# Patient Record
Sex: MALE | URBAN - METROPOLITAN AREA
[De-identification: names, ages, dates, MRNs, and addresses within clinical notes are randomized per-mention and may not be internally consistent; named-entity substitution may affect disease eponyms.]

---

## 2017-09-26 ENCOUNTER — APPOINTMENT (RX ONLY)
Dept: URBAN - METROPOLITAN AREA CLINIC 89 | Facility: CLINIC | Age: 82
Setting detail: DERMATOLOGY
End: 2017-09-26

## 2017-09-26 DIAGNOSIS — L30.9 DERMATITIS, UNSPECIFIED: ICD-10-CM

## 2017-09-26 PROBLEM — C18.9 MALIGNANT NEOPLASM OF COLON, UNSPECIFIED: Status: ACTIVE | Noted: 2017-09-26

## 2017-09-26 PROBLEM — L85.3 XEROSIS CUTIS: Status: ACTIVE | Noted: 2017-09-26

## 2017-09-26 PROBLEM — Z85.46 PERSONAL HISTORY OF MALIGNANT NEOPLASM OF PROSTATE: Status: ACTIVE | Noted: 2017-09-26

## 2017-09-26 PROCEDURE — 99203 OFFICE O/P NEW LOW 30 MIN: CPT

## 2017-09-26 PROCEDURE — ? TREATMENT REGIMEN

## 2017-09-26 PROCEDURE — ? COUNSELING

## 2017-09-26 PROCEDURE — ? PRESCRIPTION

## 2017-09-26 RX ORDER — BETAMETHASONE DIPROPIONATE 0.5 MG/G
1 OINTMENT TOPICAL BID
Qty: 1 | Refills: 3 | Status: ERX

## 2017-09-26 ASSESSMENT — LOCATION SIMPLE DESCRIPTION DERM: LOCATION SIMPLE: LEFT FOREARM

## 2017-09-26 ASSESSMENT — LOCATION ZONE DERM: LOCATION ZONE: ARM

## 2017-09-26 ASSESSMENT — LOCATION DETAILED DESCRIPTION DERM: LOCATION DETAILED: LEFT DISTAL DORSAL FOREARM

## 2017-09-26 NOTE — PROCEDURE: TREATMENT REGIMEN
Plan: Patient will return for patch testing once we hear back from the billing department
Detail Level: Zone
Initiate Treatment: Domeboro soaks twice daily followed by betamethasone ointment. extensive discussion regarding patch testing

## 2017-09-28 ENCOUNTER — RX ONLY (OUTPATIENT)
Age: 82
Setting detail: RX ONLY
End: 2017-09-28

## 2017-09-28 RX ORDER — TRIAMCINOLONE ACETONIDE 1 MG/G
OINTMENT TOPICAL
Qty: 1 | Refills: 1 | Status: ERX | COMMUNITY
Start: 2017-09-28

## 2017-10-02 ENCOUNTER — APPOINTMENT (RX ONLY)
Dept: URBAN - METROPOLITAN AREA CLINIC 57 | Facility: CLINIC | Age: 82
Setting detail: DERMATOLOGY
End: 2017-10-02

## 2017-10-02 DIAGNOSIS — R21 RASH AND OTHER NONSPECIFIC SKIN ERUPTION: ICD-10-CM

## 2017-10-02 PROBLEM — I10 ESSENTIAL (PRIMARY) HYPERTENSION: Status: ACTIVE | Noted: 2017-10-02

## 2017-10-02 PROCEDURE — ? PATCH TESTING

## 2017-10-02 PROCEDURE — ? COUNSELING

## 2017-10-02 PROCEDURE — ? TREATMENT REGIMEN

## 2017-10-02 PROCEDURE — 99213 OFFICE O/P EST LOW 20 MIN: CPT | Mod: 25

## 2017-10-02 PROCEDURE — 95044 PATCH/APPLICATION TESTS: CPT

## 2017-10-02 PROCEDURE — ? SEPARATE AND IDENTIFIABLE DOCUMENTATION

## 2017-10-02 ASSESSMENT — LOCATION SIMPLE DESCRIPTION DERM: LOCATION SIMPLE: LEFT FOREARM

## 2017-10-02 ASSESSMENT — LOCATION ZONE DERM: LOCATION ZONE: ARM

## 2017-10-02 ASSESSMENT — LOCATION DETAILED DESCRIPTION DERM: LOCATION DETAILED: LEFT DISTAL DORSAL FOREARM

## 2017-10-02 NOTE — PROCEDURE: TREATMENT REGIMEN
Continue Regimen: Domeboro soaks twice daily followed by triamcinolone ointment
Detail Level: Zone
Plan: Again extensive discussion regarding patch testing, patient will return for patch testing on Wednesday

## 2017-10-02 NOTE — PROCEDURE: PATCH TESTING
Consent: Verbal consent obtained, risks reviewed including but not limited to rash, itching, allergic reaction, systemic rash, remote possiblity of anaphylaxis to allergen.
Detail Level: Zone
Number Of Patches (Maximum Allowable Per Dos By Cms Is 90): 80
Post-Care Instructions: I reviewed with the patient in detail post-care instructions. Patient should not sweat, pick at, or get the patches wet for 48 hours.

## 2017-10-04 ENCOUNTER — APPOINTMENT (RX ONLY)
Dept: URBAN - METROPOLITAN AREA CLINIC 57 | Facility: CLINIC | Age: 82
Setting detail: DERMATOLOGY
End: 2017-10-04

## 2017-10-04 DIAGNOSIS — R21 RASH AND OTHER NONSPECIFIC SKIN ERUPTION: ICD-10-CM

## 2017-10-04 PROCEDURE — ? TREATMENT REGIMEN

## 2017-10-04 PROCEDURE — 99213 OFFICE O/P EST LOW 20 MIN: CPT

## 2017-10-04 PROCEDURE — ? COUNSELING

## 2017-10-04 PROCEDURE — ? NORTH AMERICAN 80 PATCH TEST READING

## 2017-10-04 ASSESSMENT — LOCATION SIMPLE DESCRIPTION DERM: LOCATION SIMPLE: LEFT FOREARM

## 2017-10-04 ASSESSMENT — LOCATION DETAILED DESCRIPTION DERM: LOCATION DETAILED: LEFT DISTAL DORSAL FOREARM

## 2017-10-04 ASSESSMENT — LOCATION ZONE DERM: LOCATION ZONE: ARM

## 2017-10-04 NOTE — PROCEDURE: NORTH AMERICAN 80 PATCH TEST READING
Name Of Allergen 71: Isoamyl-p-methoxycinnamate
Name Of Allergen 74: Benzalkonium chloride
Allergen 89 Reaction: no reaction
Name Of Allergen 56: DMDM Hydantoin
Name Of Allergen 61: Desoximetasone
Name Of Allergen 62: Polysorbate 80 / (Polyoxyethylenesorbitanmonooleate(Tween 80))
Name Of Allergen 50: Fragrance Mix II
Name Of Allergen 21: Diazolidinylurea (Germall II)
What Reading Time Point?: 48 hour
Name Of Allergen 41: Toluenesulfonamide formaldehyde resin
Show Allergen Counseling In The Note?: Yes
Name Of Allergen 29: Glutaral / (Glutaraldehyde)
Name Of Allergen 68: Fusidic acid sodium salt
Name Of Allergen 49: Tea Tree Oil
Name Of Allergen 25: Disperse Orange 3
Name Of Allergen 51: Disperse Yellow 3
Name Of Allergen 60: Triclosan (Irgasan )
Name Of Allergen 80: Dimethylol dihydroxyethyleneurea( Fix.CPN)
Name Of Allergen 39: Ethyl acrylate
Detail Level: Zone
Name Of Allergen 45: Budesonide
Name Of Allergen 9: Neomycin sulfate
Allergen 28 Reaction: 1+
Name Of Allergen 33: Propolis
Name Of Allergen 30: 2-Bromo-2-nitropropane-l,3-diol (Bronopol)
Name Of Allergen 8: carba mix
Name Of Allergen 14: Quaternium-15 (Dowicil 200) / (1-(3-Chloroallyl)-3,5,7-triaza-1- azoniaadamantane chloride)
Allergen 19 Reaction: 2+
Name Of Allergen 72: Hydroxyisohexyl 3-Cyclohexene Carboxaldehyde (Lyral)
Name Of Allergen 75: Amidoamine
Number Of Patches Read: 80
Name Of Allergen 78: Methylisothiazolinone + Methylchloroisothiazolinone / (Cl+-Me- isothiazolinone (Ute CG 200ppm))
Name Of Allergen 13: Epoxy resin Bisphenol A
Name Of Allergen 63: Iodopropynyl butyl carbamate
Name Of Allergen 32: Thimerosal (Merthiolate)
Name Of Allergen 44: Tixocortol-21-pivalate
Name Of Allergen 54: METHYLISOTHIAZOLINONE
Name Of Allergen 20: Nickelsulfate hexahydrate
Name Of Allergen 48: Hydrocortisone-17-butyrate
Name Of Allergen 26: Paraben Mix
Name Of Allergen 2: 2-Mercaptobenzothiazole (MBT)
Name Of Allergen 52: Benzyl salicylate
Name Of Allergen 5: Imidazolidinyl urea Germall 115)
Name Of Allergen 23: bacitracin
Name Of Allergen 36: Ethyleneurea, melamine formaldehyde mix
Name Of Allergen 1: Benzocaine
Name Of Allergen 42: Methyl methacrylate
Name Of Allergen 67: Lidocaine
Name Of Allergen 66: Compositae Mix II
Name Of Allergen 37: 2-tert-Butyl-4-methoxyphenol (BHA)
Name Of Allergen 35: Chloroxylenol (PCMX) / (4-Chloro-3,5-xylenol (PCMX))
Name Of Allergen 6: Cinnamal / (Cinnamic aldehyde)
Name Of Allergen 3: Colophonium / (Colophony)
Name Of Allergen 11: Clobetasol-17-propionate
Name Of Allergen 79: Propylene glycol
Name Of Allergen 77: Formaldehyde
Name Of Allergen 31: Sesquiterpene lactone mix
Name Of Allergen 15: 8-shdo-Awoerdrmsriozydjjoqvkwr resi
Name Of Allergen 47: Triethanolamine
Name Of Allergen 24: Mixed dialkyl thiourea
Name Of Allergen 27: Methyldibromo glutaronitrile (MDBGN)
Name Of Allergen 4: p-Phenylenediamine (PPD)
Name Of Allergen 43: Cobalt(II) chloride hexahydrate
Name Of Allergen 64: 2-n-Octyl-4-isothiazolin-3-one
Name Of Allergen 12: Ethylenediamine dihydrochloride
Name Of Allergen 55: HEMA (2-Hydroxyethyl methacrylate)
Name Of Allergen 69: Dibucaine hydrochloride
Name Of Allergen 34: Benzophenone-3 / (2-Hydroxy-4-methoxybenzophenone)
Name Of Allergen 53: Decyl Glucoside
Name Of Allergen 65: Disperse Blue 106/124 Mix
Name Of Allergen 19: Myroxylon pereirae resin / (Balsam [place])
Name Of Allergen 76: Cocamidopropylbetaine
Name Of Allergen 38: Gold(I)sodium thiosulfate dihydrate
Name Of Allergen 18: Potassium dichromate
Name Of Allergen 59: Isopropyl myristate
Name Of Allergen 16: Mercapto mix
Name Of Allergen 28: Fragrance mix
Name Of Allergen 10: Thiuram mix
Name Of Allergen 57: Cananga odorata oil / (Ylang-Ylang oil)
Name Of Allergen 22: Tocopherol/ (DL alpha tocopherol)
Name Of Allergen 46: Cocamide CLEMENTINA / (Coconut diethanolamide)
Name Of Allergen 40: Glyceryl monothioglycolate (GMTG)
Name Of Allergen 70: Benzoylperoxide
Name Of Allergen 73: Ethylhexyl Salicylate
Name Of Allergen 17: N-Isopropyl-N-phenyl--4-phenylenediamine
Name Of Allergen 7: Amerchol L 101
Name Of Allergen 58: Benzyl alcohol

## 2017-10-04 NOTE — PROCEDURE: TREATMENT REGIMEN
Detail Level: Zone
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 Pt will return for final patch testing on Friday
Discontinue Regimen: OTC antibiotic ointment
Modify Regimen: Only Vaseline to arms and hands for 1 week

## 2017-10-05 ENCOUNTER — RX ONLY (OUTPATIENT)
Age: 82
Setting detail: RX ONLY
End: 2017-10-05

## 2017-10-05 RX ORDER — HYDROXYZINE HYDROCHLORIDE 10 MG/1
TABLET, FILM COATED ORAL
Qty: 60 | Refills: 1 | Status: ERX | COMMUNITY
Start: 2017-10-05

## 2017-10-06 ENCOUNTER — APPOINTMENT (RX ONLY)
Dept: URBAN - METROPOLITAN AREA CLINIC 57 | Facility: CLINIC | Age: 82
Setting detail: DERMATOLOGY
End: 2017-10-06

## 2017-10-06 DIAGNOSIS — R21 RASH AND OTHER NONSPECIFIC SKIN ERUPTION: ICD-10-CM

## 2017-10-06 PROCEDURE — ? COUNSELING

## 2017-10-06 PROCEDURE — 99213 OFFICE O/P EST LOW 20 MIN: CPT

## 2017-10-06 PROCEDURE — ? NORTH AMERICAN 80 PATCH TEST READING

## 2017-10-06 PROCEDURE — ? TREATMENT REGIMEN

## 2017-10-06 ASSESSMENT — LOCATION SIMPLE DESCRIPTION DERM: LOCATION SIMPLE: LEFT FOREARM

## 2017-10-06 ASSESSMENT — LOCATION DETAILED DESCRIPTION DERM: LOCATION DETAILED: LEFT DISTAL DORSAL FOREARM

## 2017-10-06 ASSESSMENT — LOCATION ZONE DERM: LOCATION ZONE: ARM

## 2017-10-06 NOTE — PROCEDURE: TREATMENT REGIMEN
Discontinue Regimen: OTC antibiotic ointment
Plan: Recommended pateint go through products at home and make sure he is avoiding items with positive reagents. Information given to patient for new positive reagent. Photograph taken today
Detail Level: Zone
Modify Regimen: Only Vaseline to arms and hands for 1 week

## 2017-10-06 NOTE — PROCEDURE: [employer] 80 PATCH TEST READING
Allergen 67 Reaction: no reaction
Name Of Allergen 10: Thiuram mix
Name Of Allergen 5: Imidazolidinyl urea Germall 115)
Name Of Allergen 56: DMDM Hydantoin
Name Of Allergen 11: Clobetasol-17-propionate
Name Of Allergen 60: Triclosan (Irgasan )
Name Of Allergen 43: Cobalt(II) chloride hexahydrate
Name Of Allergen 19: Myroxylon pereirae resin / (Balsam [place])
Name Of Allergen 41: Toluenesulfonamide formaldehyde resin
Allergen 19 Reaction: 1+
Name Of Allergen 45: Budesonide
Name Of Allergen 74: Benzalkonium chloride
Name Of Allergen 26: Paraben Mix
Name Of Allergen 75: Amidoamine
Name Of Allergen 46: Cocamide CLEMENTINA / (Coconut diethanolamide)
Name Of Allergen 33: Propolis
Name Of Allergen 62: Polysorbate 80 / (Polyoxyethylenesorbitanmonooleate(Tween 80))
Name Of Allergen 38: Gold(I)sodium thiosulfate dihydrate
Name Of Allergen 44: Tixocortol-21-pivalate
Name Of Allergen 16: Mercapto mix
Name Of Allergen 8: carba mix
Name Of Allergen 13: Epoxy resin Bisphenol A
What Reading Time Point?: 48 hour
Name Of Allergen 69: Dibucaine hydrochloride
Name Of Allergen 48: Hydrocortisone-17-butyrate
Name Of Allergen 70: Benzoylperoxide
Name Of Allergen 1: Benzocaine
Name Of Allergen 30: 2-Bromo-2-nitropropane-l,3-diol (Bronopol)
Name Of Allergen 37: 2-tert-Butyl-4-methoxyphenol (BHA)
Name Of Allergen 4: p-Phenylenediamine (PPD)
Name Of Allergen 51: Disperse Yellow 3
Name Of Allergen 36: Ethyleneurea, melamine formaldehyde mix
Name Of Allergen 68: Fusidic acid sodium salt
Name Of Allergen 54: METHYLISOTHIAZOLINONE
Name Of Allergen 64: 2-n-Octyl-4-isothiazolin-3-one
Name Of Allergen 28: Fragrance mix
Name Of Allergen 67: Lidocaine
Name Of Allergen 57: Cananga odorata oil / (Ylang-Ylang oil)
Number Of Patches Read: 80
Name Of Allergen 18: Potassium dichromate
Name Of Allergen 32: Thimerosal (Merthiolate)
Show Allergen Counseling In The Note?: Yes
Name Of Allergen 24: Mixed dialkyl thiourea
Name Of Allergen 47: Triethanolamine
Name Of Allergen 39: Ethyl acrylate
Name Of Allergen 58: Benzyl alcohol
Name Of Allergen 63: Iodopropynyl butyl carbamate
Name Of Allergen 3: Colophonium / (Colophony)
Name Of Allergen 53: Decyl Glucoside
Name Of Allergen 2: 2-Mercaptobenzothiazole (MBT)
Name Of Allergen 49: Tea Tree Oil
Name Of Allergen 15: 7-piqf-Wlkxzihoqdfrshetjjuoyld resi
Name Of Allergen 80: Dimethylol dihydroxyethyleneurea( Fix.CPN)
Name Of Allergen 52: Benzyl salicylate
Name Of Allergen 9: Neomycin sulfate
Name Of Allergen 17: N-Isopropyl-N-phenyl--4-phenylenediamine
Name Of Allergen 50: Fragrance Mix II
Name Of Allergen 77: Formaldehyde
Name Of Allergen 25: Disperse Orange 3
Name Of Allergen 35: Chloroxylenol (PCMX) / (4-Chloro-3,5-xylenol (PCMX))
Name Of Allergen 78: Methylisothiazolinone + Methylchloroisothiazolinone / (Cl+-Me- isothiazolinone (Ute CG 200ppm))
Name Of Allergen 6: Cinnamal / (Cinnamic aldehyde)
Name Of Allergen 55: HEMA (2-Hydroxyethyl methacrylate)
Name Of Allergen 14: Quaternium-15 (Dowicil 200) / (1-(3-Chloroallyl)-3,5,7-triaza-1- azoniaadamantane chloride)
Name Of Allergen 21: Diazolidinylurea (Germall II)
Name Of Allergen 34: Benzophenone-3 / (2-Hydroxy-4-methoxybenzophenone)
Name Of Allergen 23: bacitracin
Name Of Allergen 79: Propylene glycol
Name Of Allergen 40: Glyceryl monothioglycolate (GMTG)
Name Of Allergen 42: Methyl methacrylate
Name Of Allergen 73: Ethylhexyl Salicylate
Name Of Allergen 29: Glutaral / (Glutaraldehyde)
Name Of Allergen 59: Isopropyl myristate
Name Of Allergen 66: Compositae Mix II
Name Of Allergen 12: Ethylenediamine dihydrochloride
Name Of Allergen 61: Desoximetasone
Name Of Allergen 20: Nickelsulfate hexahydrate
Name Of Allergen 22: Tocopherol/ (DL alpha tocopherol)
Name Of Allergen 7: Amerchol L 101
Name Of Allergen 65: Disperse Blue 106/124 Mix
Name Of Allergen 76: Cocamidopropylbetaine
Name Of Allergen 27: Methyldibromo glutaronitrile (MDBGN)
Name Of Allergen 71: Isoamyl-p-methoxycinnamate
Name Of Allergen 72: Hydroxyisohexyl 3-Cyclohexene Carboxaldehyde (Lyral)
Detail Level: Zone
Name Of Allergen 31: Sesquiterpene lactone mix

## 2017-10-10 ENCOUNTER — APPOINTMENT (RX ONLY)
Dept: URBAN - METROPOLITAN AREA CLINIC 89 | Facility: CLINIC | Age: 82
Setting detail: DERMATOLOGY
End: 2017-10-10

## 2017-10-10 DIAGNOSIS — L259 CONTACT DERMATITIS AND OTHER ECZEMA, UNSPECIFIED CAUSE: ICD-10-CM | Status: IMPROVED

## 2017-10-10 PROBLEM — L23.9 ALLERGIC CONTACT DERMATITIS, UNSPECIFIED CAUSE: Status: ACTIVE | Noted: 2017-10-10

## 2017-10-10 PROCEDURE — ? TREATMENT REGIMEN

## 2017-10-10 PROCEDURE — ? DIAGNOSIS COMMENT

## 2017-10-10 PROCEDURE — ? COUNSELING

## 2017-10-10 PROCEDURE — 99213 OFFICE O/P EST LOW 20 MIN: CPT

## 2017-10-10 ASSESSMENT — LOCATION DETAILED DESCRIPTION DERM
LOCATION DETAILED: RIGHT SUPERIOR UPPER BACK
LOCATION DETAILED: RIGHT ANTERIOR SHOULDER
LOCATION DETAILED: LEFT ANTERIOR SHOULDER
LOCATION DETAILED: RIGHT PROXIMAL POSTERIOR UPPER ARM
LOCATION DETAILED: LEFT DISTAL DORSAL FOREARM
LOCATION DETAILED: RIGHT PROXIMAL DORSAL FOREARM
LOCATION DETAILED: LEFT PROXIMAL POSTERIOR UPPER ARM
LOCATION DETAILED: XIPHOID

## 2017-10-10 ASSESSMENT — LOCATION SIMPLE DESCRIPTION DERM
LOCATION SIMPLE: RIGHT SHOULDER
LOCATION SIMPLE: RIGHT UPPER BACK
LOCATION SIMPLE: RIGHT UPPER ARM
LOCATION SIMPLE: LEFT SHOULDER
LOCATION SIMPLE: ABDOMEN
LOCATION SIMPLE: LEFT FOREARM
LOCATION SIMPLE: LEFT UPPER ARM
LOCATION SIMPLE: RIGHT FOREARM

## 2017-10-10 ASSESSMENT — LOCATION ZONE DERM
LOCATION ZONE: ARM
LOCATION ZONE: TRUNK

## 2017-10-10 NOTE — PROCEDURE: DIAGNOSIS COMMENT
Comment: Patient tested positive for allergens : colophonium / (colophony), neomycin sulfate, myroxlon king resin/ ( balsam [place]), fragrance mix, tea tree oil.
Detail Level: Simple

## 2017-10-10 NOTE — PROCEDURE: TREATMENT REGIMEN
Initiate Treatment: Hydroxyzine 10mg 1-2 po QHS itch
Continue Regimen: TAC 0.1% ointment BID 2 weeks, 1 week off, repeat
Detail Level: Zone
Otc Regimen: Vaseline daily
Plan: Patient to discontinue all products, discussed allergens he is allergic too. Patient only to apply TAC 0.1% ointment BID 2 weeks, 1 week off, repeat and Vaseline. Hydroxyzine to be taken before bed. F/u in 4 weeks

## 2017-10-24 ENCOUNTER — APPOINTMENT (RX ONLY)
Dept: URBAN - METROPOLITAN AREA CLINIC 89 | Facility: CLINIC | Age: 82
Setting detail: DERMATOLOGY
End: 2017-10-24

## 2017-10-24 DIAGNOSIS — L259 CONTACT DERMATITIS AND OTHER ECZEMA, UNSPECIFIED CAUSE: ICD-10-CM | Status: IMPROVED

## 2017-10-24 PROBLEM — L23.9 ALLERGIC CONTACT DERMATITIS, UNSPECIFIED CAUSE: Status: ACTIVE | Noted: 2017-10-24

## 2017-10-24 PROCEDURE — ? TREATMENT REGIMEN

## 2017-10-24 PROCEDURE — 99213 OFFICE O/P EST LOW 20 MIN: CPT

## 2017-10-24 PROCEDURE — ? COUNSELING

## 2017-10-24 ASSESSMENT — LOCATION DETAILED DESCRIPTION DERM
LOCATION DETAILED: RIGHT SUPERIOR UPPER BACK
LOCATION DETAILED: RIGHT ANTERIOR SHOULDER
LOCATION DETAILED: LEFT PROXIMAL POSTERIOR UPPER ARM
LOCATION DETAILED: LEFT ANTERIOR SHOULDER
LOCATION DETAILED: RIGHT PROXIMAL POSTERIOR UPPER ARM
LOCATION DETAILED: LEFT DISTAL DORSAL FOREARM
LOCATION DETAILED: XIPHOID
LOCATION DETAILED: RIGHT PROXIMAL DORSAL FOREARM

## 2017-10-24 ASSESSMENT — LOCATION SIMPLE DESCRIPTION DERM
LOCATION SIMPLE: RIGHT UPPER BACK
LOCATION SIMPLE: ABDOMEN
LOCATION SIMPLE: RIGHT FOREARM
LOCATION SIMPLE: LEFT SHOULDER
LOCATION SIMPLE: RIGHT UPPER ARM
LOCATION SIMPLE: LEFT FOREARM
LOCATION SIMPLE: RIGHT SHOULDER
LOCATION SIMPLE: LEFT UPPER ARM

## 2017-10-24 ASSESSMENT — LOCATION ZONE DERM
LOCATION ZONE: TRUNK
LOCATION ZONE: ARM

## 2017-10-24 NOTE — PROCEDURE: TREATMENT REGIMEN
Discontinue Regimen: Hydroxyzine 10mg 1-2 po QHS itch
Detail Level: Zone
Otc Regimen: Vaseline daily\\nRec Xyzal or Claritin QD PRN for itching
Continue Regimen: triamcinolone oint bid prn and vaseline oint several times daily

## 2022-08-29 ENCOUNTER — APPOINTMENT (RX ONLY)
Dept: URBAN - METROPOLITAN AREA CLINIC 5 | Facility: CLINIC | Age: 87
Setting detail: DERMATOLOGY
End: 2022-08-29

## 2022-08-29 ENCOUNTER — RX ONLY (OUTPATIENT)
Age: 87
Setting detail: RX ONLY
End: 2022-08-29

## 2022-08-29 DIAGNOSIS — L50.8 OTHER URTICARIA: ICD-10-CM

## 2022-08-29 DIAGNOSIS — L30.8 OTHER SPECIFIED DERMATITIS: ICD-10-CM

## 2022-08-29 PROCEDURE — ? PRESCRIPTION

## 2022-08-29 PROCEDURE — ? COUNSELING

## 2022-08-29 PROCEDURE — 99203 OFFICE O/P NEW LOW 30 MIN: CPT

## 2022-08-29 PROCEDURE — ? PRESCRIPTION MEDICATION MANAGEMENT

## 2022-08-29 RX ORDER — TRIAMCINOLONE ACETONIDE 1 MG/G
CREAM TOPICAL
Qty: 454 | Refills: 3 | Status: ERX | COMMUNITY
Start: 2022-08-29

## 2022-08-29 RX ORDER — MOMETASONE FUROATE 1 MG/ML
SOLUTION TOPICAL
Qty: 60 | Refills: 5 | Status: ERX | COMMUNITY
Start: 2022-08-29

## 2022-08-29 RX ORDER — TRIAMCINOLONE ACETONIDE 1 MG/G
OINTMENT TOPICAL
Qty: 454 | Refills: 3 | Status: CANCELLED
Stop reason: CLARIF

## 2022-08-29 RX ADMIN — MOMETASONE FUROATE: 1 SOLUTION TOPICAL at 00:00

## 2022-08-29 ASSESSMENT — LOCATION DETAILED DESCRIPTION DERM
LOCATION DETAILED: RIGHT SUPERIOR MEDIAL LOWER BACK
LOCATION DETAILED: RIGHT SUPERIOR MEDIAL MIDBACK
LOCATION DETAILED: LEFT MEDIAL SUPERIOR CHEST
LOCATION DETAILED: RIGHT SUPERIOR UPPER BACK
LOCATION DETAILED: RIGHT MID-UPPER BACK
LOCATION DETAILED: LEFT ANTERIOR PROXIMAL THIGH

## 2022-08-29 ASSESSMENT — LOCATION ZONE DERM
LOCATION ZONE: TRUNK
LOCATION ZONE: LEG

## 2022-08-29 ASSESSMENT — LOCATION SIMPLE DESCRIPTION DERM
LOCATION SIMPLE: CHEST
LOCATION SIMPLE: RIGHT LOWER BACK
LOCATION SIMPLE: RIGHT UPPER BACK
LOCATION SIMPLE: LEFT THIGH

## 2022-08-29 NOTE — PROCEDURE: MIPS QUALITY
Detail Level: Detailed
Quality 130: Documentation Of Current Medications In The Medical Record: Current Medications Documented
Quality 226: Preventive Care And Screening: Tobacco Use: Screening And Cessation Intervention: Patient screened for tobacco use and is an ex/non-smoker
Quality 111:Pneumonia Vaccination Status For Older Adults: Pneumococcal vaccine administered on or after patient’s 60th birthday and before the end of the measurement period
Quality 431: Preventive Care And Screening: Unhealthy Alcohol Use - Screening: Patient not identified as an unhealthy alcohol user when screened for unhealthy alcohol use using a systematic screening method

## 2022-08-29 NOTE — PROCEDURE: PRESCRIPTION MEDICATION MANAGEMENT
Plan: Discussed beginning treatment, triamcinolone topical ointment to be used on affected areas of the body (not face) patient and daughter verbally voiced understanding topical is a steroid is not to be used more than 14 days in one month, can cause skin thinning. To be used 2-3x a week. Over the counter Cetaphil moisturizer recommended to be used on affected areas of the body and face. Mometasone topical solution to be applied to the scalp twice weekly qhs. To follow up in office should symptoms worsen or stay the same.
Initiate Treatment: Triamcinolone topical ointment\\nMometasone topical solution
Detail Level: Zone
Render In Strict Bullet Format?: No